# Patient Record
Sex: FEMALE | Race: WHITE | NOT HISPANIC OR LATINO | Employment: OTHER | URBAN - METROPOLITAN AREA
[De-identification: names, ages, dates, MRNs, and addresses within clinical notes are randomized per-mention and may not be internally consistent; named-entity substitution may affect disease eponyms.]

---

## 2021-08-19 DIAGNOSIS — Z12.31 SCREENING MAMMOGRAM FOR HIGH-RISK PATIENT: Primary | ICD-10-CM

## 2021-08-20 ENCOUNTER — HOSPITAL ENCOUNTER (OUTPATIENT)
Dept: RADIOLOGY | Facility: CLINIC | Age: 81
Discharge: HOME | End: 2021-08-20
Payer: MEDICARE

## 2021-08-20 VITALS — BODY MASS INDEX: 30.35 KG/M2 | WEIGHT: 212 LBS | HEIGHT: 70 IN

## 2021-08-20 DIAGNOSIS — Z12.31 SCREENING MAMMOGRAM FOR HIGH-RISK PATIENT: ICD-10-CM

## 2021-08-20 PROCEDURE — 77067 SCR MAMMO BI INCL CAD: CPT | Mod: S$GLB,,, | Performed by: RADIOLOGY

## 2021-08-20 PROCEDURE — 77067 MAMMO DIGITAL SCREENING BILAT WITH TOMO: ICD-10-PCS | Mod: S$GLB,,, | Performed by: RADIOLOGY

## 2021-08-20 PROCEDURE — 77063 MAMMO DIGITAL SCREENING BILAT WITH TOMO: ICD-10-PCS | Mod: S$GLB,,, | Performed by: RADIOLOGY

## 2021-08-20 PROCEDURE — 77063 BREAST TOMOSYNTHESIS BI: CPT | Mod: S$GLB,,, | Performed by: RADIOLOGY

## 2021-09-07 ENCOUNTER — TELEPHONE (OUTPATIENT)
Dept: OBSTETRICS AND GYNECOLOGY | Facility: CLINIC | Age: 81
End: 2021-09-07

## 2021-09-14 ENCOUNTER — TELEPHONE (OUTPATIENT)
Dept: OBSTETRICS AND GYNECOLOGY | Facility: CLINIC | Age: 81
End: 2021-09-14

## 2022-08-22 ENCOUNTER — HOSPITAL ENCOUNTER (OUTPATIENT)
Dept: RADIOLOGY | Facility: CLINIC | Age: 82
Discharge: HOME | End: 2022-08-22
Attending: OBSTETRICS & GYNECOLOGY
Payer: MEDICARE

## 2022-08-22 VITALS — HEIGHT: 70 IN | WEIGHT: 212 LBS | BODY MASS INDEX: 30.35 KG/M2

## 2022-08-22 DIAGNOSIS — Z12.31 ENCOUNTER FOR SCREENING MAMMOGRAM FOR MALIGNANT NEOPLASM OF BREAST: ICD-10-CM

## 2022-08-22 PROCEDURE — 77067 MAMMO DIGITAL SCREENING BILAT WITH TOMO: ICD-10-PCS | Mod: S$GLB,,, | Performed by: RADIOLOGY

## 2022-08-22 PROCEDURE — 77063 MAMMO DIGITAL SCREENING BILAT WITH TOMO: ICD-10-PCS | Mod: S$GLB,,, | Performed by: RADIOLOGY

## 2022-08-22 PROCEDURE — 77063 BREAST TOMOSYNTHESIS BI: CPT | Mod: S$GLB,,, | Performed by: RADIOLOGY

## 2022-08-22 PROCEDURE — 77067 SCR MAMMO BI INCL CAD: CPT | Mod: S$GLB,,, | Performed by: RADIOLOGY

## 2023-06-28 ENCOUNTER — TELEPHONE (OUTPATIENT)
Dept: OTOLARYNGOLOGY | Facility: CLINIC | Age: 83
End: 2023-06-28
Payer: MEDICARE

## 2023-06-29 ENCOUNTER — OFFICE VISIT (OUTPATIENT)
Dept: OTOLARYNGOLOGY | Facility: CLINIC | Age: 83
End: 2023-06-29
Payer: MEDICARE

## 2023-06-29 VITALS
SYSTOLIC BLOOD PRESSURE: 157 MMHG | DIASTOLIC BLOOD PRESSURE: 81 MMHG | WEIGHT: 199.63 LBS | BODY MASS INDEX: 28.64 KG/M2

## 2023-06-29 DIAGNOSIS — H91.90 HEARING LOSS, UNSPECIFIED HEARING LOSS TYPE, UNSPECIFIED LATERALITY: ICD-10-CM

## 2023-06-29 DIAGNOSIS — J30.0 VASOMOTOR RHINITIS: ICD-10-CM

## 2023-06-29 DIAGNOSIS — H60.8X3: Primary | ICD-10-CM

## 2023-06-29 PROCEDURE — 99203 OFFICE O/P NEW LOW 30 MIN: CPT | Mod: S$PBB,,, | Performed by: OTOLARYNGOLOGY

## 2023-06-29 PROCEDURE — 99999 PR PBB SHADOW E&M-EST. PATIENT-LVL II: ICD-10-PCS | Mod: PBBFAC,,, | Performed by: OTOLARYNGOLOGY

## 2023-06-29 PROCEDURE — 99999 PR PBB SHADOW E&M-EST. PATIENT-LVL II: CPT | Mod: PBBFAC,,, | Performed by: OTOLARYNGOLOGY

## 2023-06-29 PROCEDURE — 99203 PR OFFICE/OUTPT VISIT, NEW, LEVL III, 30-44 MIN: ICD-10-PCS | Mod: S$PBB,,, | Performed by: OTOLARYNGOLOGY

## 2023-06-29 PROCEDURE — 99212 OFFICE O/P EST SF 10 MIN: CPT | Mod: PBBFAC,PN | Performed by: OTOLARYNGOLOGY

## 2023-06-29 RX ORDER — CIPROFLOXACIN HYDROCHLORIDE 3 MG/ML
SOLUTION/ DROPS OPHTHALMIC
Qty: 10 ML | Refills: 3 | Status: SHIPPED | OUTPATIENT
Start: 2023-06-29

## 2023-06-29 RX ORDER — EVOLOCUMAB 140 MG/ML
140 INJECTION, SOLUTION SUBCUTANEOUS
COMMUNITY
Start: 2022-09-15

## 2023-06-29 RX ORDER — ERGOCALCIFEROL 1.25 MG/1
50000 CAPSULE ORAL
COMMUNITY
Start: 2023-01-07

## 2023-06-29 RX ORDER — LEVOTHYROXINE SODIUM 88 UG/1
88 TABLET ORAL
COMMUNITY
Start: 2023-06-26

## 2023-06-29 RX ORDER — SPIRONOLACTONE 25 MG/1
25 TABLET ORAL
COMMUNITY
Start: 2023-04-18

## 2023-06-29 RX ORDER — IPRATROPIUM BROMIDE 42 UG/1
2 SPRAY, METERED NASAL 4 TIMES DAILY
Qty: 45 ML | Refills: 10 | Status: SHIPPED | OUTPATIENT
Start: 2023-06-29 | End: 2024-06-28

## 2023-06-29 RX ORDER — PANTOPRAZOLE SODIUM 40 MG/1
40 TABLET, DELAYED RELEASE ORAL
COMMUNITY
Start: 2023-06-05

## 2023-06-29 NOTE — PROGRESS NOTES
Subjective:       Patient ID: Dacia Tovar is a 83 y.o. female.    Chief Complaint: Sinus Problem (Pt c/o sinus headaches, cough, and ear draining.  )      This patient presents telling me that she has a long history of hearing loss she is had hearing aids for at least 10 years she recently got a 2nd pair for 5000 dollars that she says her a piece of junk I do not know the exact brands but she did mentioned them to me in any case she has a pair of hearing aids in and we communicate with only slight difficulty as long as I keep my voice character loud.      She tells me she has had some odorous drainage from both ears she says it smells like Pseudomonas and she has a little bit of discomfort on the left ear in particular that bothers her.  She also mentions sinus drainage which include some postnasal drip but also a significant amount of anterior rhinorrhea that is especially notable in the morning.    Sinus Problem        Objective:      ENT Physical Exam  Constitutional  Appearance: patient appears well-developed, well-nourished and well-groomed,  Communication/Voice: communication appropriate for developmental age; vocal quality normal;  Head and Face  Appearance: head appears normal, face appears normal and face appears atraumatic;  Salivary: glands normal;  Ear  Hearing: intact;  Auricles: right auricle normal; left auricle normal;  Tympanic Membranes: right tympanic membrane normal; left tympanic membrane normal;  Ear comments: After removing her hearing aids on the left side in the ear canal stuck to the superior canal wall was an extra silicone ear piece from one of her hearing aids.  It was easy to remove and there was some odor this thin material between the foreign body in the ear canal.  On both sides there is flaky crusty yellow flakes that do not obstruct and are not large in volume but are notable.  There was no purulence on the right side or foreign body.  Nose  External Nose: nares patent  bilaterally; external nose normal;  Internal Nose: nasal mucosa normal; septum normal; bilateral inferior turbinates normal;  Oral Cavity/Oropharynx  Lips: normal;  Teeth: normal;  Gums: gingiva normal;  Tongue: normal;  Oral mucosa: normal;  Hard palate: normal;  Soft palate: normal;  Tonsils: normal;  Base of Tongue: normal;  Posterior pharyngeal wall: normal;  Neck  Neck: neck normal; neck palpation normal;  Thyroid: thyroid normal;  Lymphatic  Palpation: lymph nodes normal;        Assessment:       1. Contact otitis externa of both ears, unspecified chronicity    2. Hearing loss, unspecified hearing loss type, unspecified laterality    3. Vasomotor rhinitis         Plan:          So I think she needs some eardrops for especially the odorous material on the left that is secondary to the foreign body that we removed I sent in Cipro drops for her to use for at least five or seven days even if things improve I have asked her to let me know if that has not been helpful she can contact me through the portal or on the phone and we can discuss different drops.  She has used neomycin drops before she mentions.      Additionally for the drainage I have asked her to try ipratropium spray she does not think she is tried that before but may have and see if that helps with her drainage.

## 2023-10-03 ENCOUNTER — TELEPHONE (OUTPATIENT)
Dept: OBSTETRICS AND GYNECOLOGY | Facility: CLINIC | Age: 83
End: 2023-10-03
Payer: MEDICARE

## 2023-10-03 NOTE — TELEPHONE ENCOUNTER
Pt has not been seen since 2020. Adv pt that she would need to be seen in office before we can give her an order. Pt understood. Pt has been scheduled for 10/24 with dr. jean  ----- Message from Adrianna Mishra sent at 10/3/2023  3:15 PM CDT -----  Contact: PT  Type: Needs Medical Advice    Who Called: PT  Best Call Back Number: [phone]  Additional  Information: PT requesting a call back regarding getting an Mammogram, order. Pt upset that she is no longer able to have in done there at the office. Said she's old and running around to diff places is hard on her.  Please Advise- Thank you

## 2023-10-24 ENCOUNTER — OFFICE VISIT (OUTPATIENT)
Dept: OBSTETRICS AND GYNECOLOGY | Facility: CLINIC | Age: 83
End: 2023-10-24
Payer: MEDICARE

## 2023-10-24 VITALS
DIASTOLIC BLOOD PRESSURE: 73 MMHG | SYSTOLIC BLOOD PRESSURE: 147 MMHG | WEIGHT: 182.81 LBS | HEART RATE: 70 BPM | HEIGHT: 70 IN | BODY MASS INDEX: 26.17 KG/M2

## 2023-10-24 DIAGNOSIS — N39.3 PRIMARY STRESS URINARY INCONTINENCE: Primary | ICD-10-CM

## 2023-10-24 DIAGNOSIS — N32.81 OVERACTIVE BLADDER: ICD-10-CM

## 2023-10-24 PROBLEM — M19.90 ARTHRITIS: Status: ACTIVE | Noted: 2023-10-24

## 2023-10-24 PROBLEM — R42 DIZZINESS: Status: ACTIVE | Noted: 2023-10-24

## 2023-10-24 PROBLEM — M10.9 GOUT: Status: ACTIVE | Noted: 2023-10-24

## 2023-10-24 PROBLEM — E55.9 VITAMIN D DEFICIENCY: Status: ACTIVE | Noted: 2023-10-24

## 2023-10-24 PROBLEM — F41.9 ANXIETY DISORDER: Status: ACTIVE | Noted: 2023-10-24

## 2023-10-24 PROBLEM — Z86.19 HISTORY OF HEPATITIS: Status: ACTIVE | Noted: 2023-10-24

## 2023-10-24 PROBLEM — D72.819 LEUKOPENIA: Status: ACTIVE | Noted: 2023-10-24

## 2023-10-24 PROBLEM — E78.00 PURE HYPERCHOLESTEROLEMIA: Status: ACTIVE | Noted: 2023-10-24

## 2023-10-24 PROBLEM — E79.0 BLOOD URATE RAISED: Status: ACTIVE | Noted: 2023-10-24

## 2023-10-24 PROBLEM — K21.9 GASTROESOPHAGEAL REFLUX DISEASE: Status: ACTIVE | Noted: 2023-10-24

## 2023-10-24 PROBLEM — I77.9 DISORDER OF CAROTID ARTERY: Status: ACTIVE | Noted: 2023-10-24

## 2023-10-24 PROCEDURE — 87086 URINE CULTURE/COLONY COUNT: CPT | Performed by: OBSTETRICS & GYNECOLOGY

## 2023-10-24 PROCEDURE — 99213 PR OFFICE/OUTPT VISIT, EST, LEVL III, 20-29 MIN: ICD-10-PCS | Mod: S$GLB,,, | Performed by: OBSTETRICS & GYNECOLOGY

## 2023-10-24 PROCEDURE — 99213 OFFICE O/P EST LOW 20 MIN: CPT | Mod: S$GLB,,, | Performed by: OBSTETRICS & GYNECOLOGY

## 2023-10-24 RX ORDER — ASPIRIN 81 MG/1
81 TABLET ORAL
COMMUNITY

## 2023-10-24 RX ORDER — ERGOCALCIFEROL 1.25 MG/1
50000 CAPSULE ORAL
COMMUNITY
Start: 2022-07-07

## 2023-10-24 RX ORDER — POTASSIUM CHLORIDE 750 MG/1
10 TABLET, EXTENDED RELEASE ORAL
COMMUNITY
Start: 2022-07-07

## 2023-10-24 RX ORDER — TOLTERODINE 4 MG/1
4 CAPSULE, EXTENDED RELEASE ORAL DAILY
Qty: 30 CAPSULE | Refills: 11 | Status: SHIPPED | OUTPATIENT
Start: 2023-10-24 | End: 2024-10-23

## 2023-10-24 RX ORDER — TRIAMCINOLONE ACETONIDE 55 UG/1
2 SPRAY, METERED NASAL
COMMUNITY

## 2023-10-24 RX ORDER — METOPROLOL SUCCINATE 25 MG/1
25 TABLET, EXTENDED RELEASE ORAL
COMMUNITY
Start: 2023-08-02

## 2023-10-24 RX ORDER — AMLODIPINE BESYLATE 5 MG/1
5 TABLET ORAL
COMMUNITY
Start: 2023-10-18

## 2023-10-24 NOTE — PROGRESS NOTES
Dcaia Tovar is a 83 y.o.  who presents today for GYN problem visit.     C/C:   Chief Complaint   Patient presents with    Urinary Incontinence       HPI: Has GYN related concerns including bladder issue.  Patient states that she has problems with nocturia since being placed on her new diuretic blood pressure medication and she now is having complete emptying of her bladder when she gets up at night time to go to the bathroom which has caused her to fall and she has had numerous falls and sometimes with head injuries.  She had a sling bladder procedure done by Dr. Blas in  and had fairly good results with that but in the last year to she has started leaking urine mainly at nighttime when she stands up to go to the bathroom.  We discussed options and she would like to try medications initially so we will start her on Detrol at this time but she probably needs to see her urologist, Dr. Blas and see if he has any better options for her.  She states she will call me if the Detrol is not working good and I will give her a referral to Dr. Blas.    MENSTRUAL HISTORY  No LMP recorded. Patient has had a hysterectomy..      PAP HISTORY: last pap hysterectomy,  denies any history of abnormal pap smear        Review of patient's allergies indicates:   Allergen Reactions    Rosuvastatin      Other reaction(s): muscle cramp  elevated cpk    Meperidine Nausea And Vomiting     Violently ill per patient  cs       History reviewed. No pertinent past medical history.  Past Surgical History:   Procedure Laterality Date    BLADDER SUSPENSION  2002    HYSTERECTOMY      OOPHORECTOMY       Past Surgical History:   Procedure Laterality Date    BLADDER SUSPENSION  2002    HYSTERECTOMY      OOPHORECTOMY       OB History    Para Term  AB Living   4 4 4         SAB IAB Ectopic Multiple Live Births                  # Outcome Date GA Lbr Paolo/2nd Weight Sex Delivery Anes PTL Lv   4 Term        "     3 Term            2 Term            1 Term              OB History          4    Para   4    Term   4            AB        Living             SAB        IAB        Ectopic        Multiple        Live Births                   Family History   Problem Relation Age of Onset    Breast cancer Sister      Social History     Substance and Sexual Activity   Sexual Activity Not on file       No, Primary Doctor          ROS:  GENERAL: Denies weight gain or weight loss. Feeling well overall.   SKIN: Denies rash or lesions.   HEAD: Denies head injury or headache.   NODES: Denies enlarged lymph nodes.   CHEST: Denies chest pain or shortness of breath.    ABDOMEN: No abdominal pain, constipation, diarrhea, nausea, vomiting or rectal bleeding.   URINARY:  See HPI  REPRODUCTIVE: See HPI.   BREASTS: denies pain, lumps, or nipple discharge.   HEMATOLOGIC: No easy bruisability or excessive bleeding.   MUSCULOSKELETAL: Denies joint pain or swelling.   NEUROLOGIC: Denies syncope or weakness.   PSYCHIATRIC: Denies depression, anxiety or mood swings.      OBJECTIVE:  BP (!) 147/73   Pulse 70   Ht 5' 10" (1.778 m)   Wt 82.9 kg (182 lb 12.8 oz)   BMI 26.23 kg/m²   PHYSICAL EXAM:  APPEARANCE: Well nourished, well developed, in no acute distress.  AFFECT: WNL, alert and oriented x 3  SKIN: No acne or hirsutism  CHEST: Good respiratory effect  ABDOMEN: Soft.  No tenderness or masses.  No hepatosplenomegaly.  No hernias.  BREASTS:  Deferred  PELVIC:  External genitalia normal.  Small vaginal introitus opening and has vaginal atrophy signs.  The anterior vaginal wall is well supported and no significant cystocele or rectocele palpated.  Uterus tubes and ovaries are absent.    EXTREMITIES: No edema.      A:  Urinary incontinence, nocturia, overactive bladder,  83 y.o. female  for GYN problem visit  Body mass index is 26.23 kg/m².  Patient Active Problem List   Diagnosis    Anxiety disorder    Asthma    Arthritis    " Blood urate raised    Gout    Coronary artery disease    Disorder of carotid artery    Dizziness    Gastroesophageal reflux disease    History of hepatitis    Hypertension    Hypothyroidism    Leukopenia    Pure hypercholesterolemia    Vitamin D deficiency    Overactive bladder    Primary stress urinary incontinence         P:  Will try Detrol initially but probably needs a consult by her prior urologist Dr. Blas.  Overactive bladder  -     tolterodine (DETROL LA) 4 MG 24 hr capsule; Take 1 capsule (4 mg total) by mouth once daily.  Dispense: 30 capsule; Refill: 11    Primary stress urinary incontinence  -     tolterodine (DETROL LA) 4 MG 24 hr capsule; Take 1 capsule (4 mg total) by mouth once daily.  Dispense: 30 capsule; Refill: 11      ----Continue annual exams, return then or sooner prn      Follow up if symptoms worsen or fail to improve.

## 2023-10-26 LAB — BACTERIA UR CULT: NO GROWTH

## 2024-08-06 ENCOUNTER — OFFICE VISIT (OUTPATIENT)
Dept: PODIATRY | Facility: CLINIC | Age: 84
End: 2024-08-06
Payer: MEDICARE

## 2024-08-06 VITALS
HEART RATE: 70 BPM | HEIGHT: 70 IN | WEIGHT: 178 LBS | DIASTOLIC BLOOD PRESSURE: 83 MMHG | BODY MASS INDEX: 25.48 KG/M2 | SYSTOLIC BLOOD PRESSURE: 143 MMHG

## 2024-08-06 DIAGNOSIS — L97.511 ULCER OF BOTH FEET, LIMITED TO BREAKDOWN OF SKIN: Primary | ICD-10-CM

## 2024-08-06 DIAGNOSIS — L97.521 ULCER OF BOTH FEET, LIMITED TO BREAKDOWN OF SKIN: Primary | ICD-10-CM

## 2024-08-06 DIAGNOSIS — I73.9 PERIPHERAL VASCULAR DISEASE: ICD-10-CM

## 2024-08-06 PROCEDURE — 99214 OFFICE O/P EST MOD 30 MIN: CPT | Mod: PBBFAC,PN | Performed by: PODIATRIST

## 2024-08-06 PROCEDURE — 99202 OFFICE O/P NEW SF 15 MIN: CPT | Mod: S$PBB,,, | Performed by: PODIATRIST

## 2024-08-06 PROCEDURE — 99999 PR PBB SHADOW E&M-EST. PATIENT-LVL IV: CPT | Mod: PBBFAC,,, | Performed by: PODIATRIST

## 2024-08-06 RX ORDER — APIXABAN 2.5 MG/1
TABLET, FILM COATED ORAL
COMMUNITY
Start: 2024-01-02

## 2024-08-06 RX ORDER — VERAPAMIL HYDROCHLORIDE 120 MG/1
TABLET, FILM COATED, EXTENDED RELEASE ORAL
COMMUNITY
Start: 2024-03-18

## 2024-08-06 RX ORDER — VALSARTAN 40 MG/1
TABLET ORAL
COMMUNITY
Start: 2023-12-04

## 2024-08-06 RX ORDER — FUROSEMIDE 20 MG/1
TABLET ORAL
COMMUNITY
Start: 2024-01-11

## 2024-08-08 PROBLEM — L97.511 ULCER OF BOTH FEET, LIMITED TO BREAKDOWN OF SKIN: Status: ACTIVE | Noted: 2024-08-08

## 2024-08-08 PROBLEM — I73.9 PERIPHERAL VASCULAR DISEASE: Status: ACTIVE | Noted: 2024-08-08

## 2024-08-08 PROBLEM — L97.521 ULCER OF BOTH FEET, LIMITED TO BREAKDOWN OF SKIN: Status: ACTIVE | Noted: 2024-08-08

## 2024-12-23 ENCOUNTER — TELEPHONE (OUTPATIENT)
Dept: OPHTHALMOLOGY | Facility: CLINIC | Age: 84
End: 2024-12-23
Payer: MEDICARE

## 2024-12-23 NOTE — TELEPHONE ENCOUNTER
Spoke with pt about referral in [facility]. She is added to the August 2025 waiting list.

## 2025-02-25 ENCOUNTER — TELEPHONE (OUTPATIENT)
Dept: OPHTHALMOLOGY | Facility: CLINIC | Age: 85
End: 2025-02-25
Payer: MEDICARE

## 2025-02-25 NOTE — TELEPHONE ENCOUNTER
----- Message from Meet Trejo sent at 2/24/2025  5:06 PM CST -----    ----- Message -----  From: Vinita Ferrer  Sent: 2/24/2025   3:41 PM CST  To: Brendon THOMAS Staff    Type:  Patient Referral CallWho Called:Rosa Calvillo the patient know what this is regarding?:ApptWould the patient rather a call back or a response via [facility]? [facility] Call Back Number:[phone] Information:

## 2025-08-27 ENCOUNTER — LAB VISIT (OUTPATIENT)
Dept: LAB | Facility: HOSPITAL | Age: 85
End: 2025-08-27
Attending: STUDENT IN AN ORGANIZED HEALTH CARE EDUCATION/TRAINING PROGRAM
Payer: MEDICARE

## 2025-08-27 ENCOUNTER — CLINICAL SUPPORT (OUTPATIENT)
Dept: OPHTHALMOLOGY | Facility: CLINIC | Age: 85
End: 2025-08-27
Payer: MEDICARE

## 2025-08-27 ENCOUNTER — OFFICE VISIT (OUTPATIENT)
Dept: OPHTHALMOLOGY | Facility: CLINIC | Age: 85
End: 2025-08-27
Payer: MEDICARE

## 2025-08-27 DIAGNOSIS — H54.7 UNSPECIFIED VISUAL LOSS: ICD-10-CM

## 2025-08-27 DIAGNOSIS — H53.15 VISUAL DISTORTIONS OF SHAPE AND SIZE: Primary | ICD-10-CM

## 2025-08-27 DIAGNOSIS — H53.15 VISUAL DISTORTIONS OF SHAPE AND SIZE: ICD-10-CM

## 2025-08-27 LAB
ABSOLUTE EOSINOPHIL (OHS): 0 K/UL
ABSOLUTE MONOCYTE (OHS): 0.24 K/UL (ref 0.3–1)
ABSOLUTE NEUTROPHIL COUNT (OHS): 2.84 K/UL (ref 1.8–7.7)
ALBUMIN SERPL BCP-MCNC: 4.6 G/DL (ref 3.5–5.2)
ALP SERPL-CCNC: 124 UNIT/L (ref 40–150)
ALT SERPL W/O P-5'-P-CCNC: 23 UNIT/L (ref 0–55)
ANION GAP (OHS): 10 MMOL/L (ref 8–16)
AST SERPL-CCNC: 31 UNIT/L (ref 0–50)
BASOPHILS # BLD AUTO: 0.03 K/UL
BASOPHILS NFR BLD AUTO: 0.8 %
BILIRUB SERPL-MCNC: 0.6 MG/DL (ref 0.1–1)
BUN SERPL-MCNC: 33 MG/DL (ref 8–23)
CALCIUM SERPL-MCNC: 10.5 MG/DL (ref 8.7–10.5)
CHLORIDE SERPL-SCNC: 105 MMOL/L (ref 95–110)
CO2 SERPL-SCNC: 26 MMOL/L (ref 23–29)
CREAT SERPL-MCNC: 1.2 MG/DL (ref 0.5–1.4)
ERYTHROCYTE [DISTWIDTH] IN BLOOD BY AUTOMATED COUNT: 11.7 % (ref 11.5–14.5)
GFR SERPLBLD CREATININE-BSD FMLA CKD-EPI: 45 ML/MIN/1.73/M2
GLUCOSE SERPL-MCNC: 101 MG/DL (ref 70–110)
HCT VFR BLD AUTO: 34.8 % (ref 37–48.5)
HGB BLD-MCNC: 11.4 GM/DL (ref 12–16)
IMM GRANULOCYTES # BLD AUTO: 0 K/UL (ref 0–0.04)
IMM GRANULOCYTES NFR BLD AUTO: 0 % (ref 0–0.5)
LYMPHOCYTES # BLD AUTO: 0.77 K/UL (ref 1–4.8)
MCH RBC QN AUTO: 31.7 PG (ref 27–31)
MCHC RBC AUTO-ENTMCNC: 32.8 G/DL (ref 32–36)
MCV RBC AUTO: 97 FL (ref 82–98)
NUCLEATED RBC (/100WBC) (OHS): 0 /100 WBC
PLATELET # BLD AUTO: 191 K/UL (ref 150–450)
PMV BLD AUTO: 10.8 FL (ref 9.2–12.9)
POTASSIUM SERPL-SCNC: 4.2 MMOL/L (ref 3.5–5.1)
PROT SERPL-MCNC: 7.3 GM/DL (ref 6–8.4)
RBC # BLD AUTO: 3.6 M/UL (ref 4–5.4)
RELATIVE EOSINOPHIL (OHS): 0 %
RELATIVE LYMPHOCYTE (OHS): 19.8 % (ref 18–48)
RELATIVE MONOCYTE (OHS): 6.2 % (ref 4–15)
RELATIVE NEUTROPHIL (OHS): 73.2 % (ref 38–73)
SODIUM SERPL-SCNC: 141 MMOL/L (ref 136–145)
WBC # BLD AUTO: 3.88 K/UL (ref 3.9–12.7)

## 2025-08-27 PROCEDURE — 92133 CPTRZD OPH DX IMG PST SGM ON: CPT | Mod: PBBFAC | Performed by: STUDENT IN AN ORGANIZED HEALTH CARE EDUCATION/TRAINING PROGRAM

## 2025-08-27 PROCEDURE — 82164 ANGIOTENSIN I ENZYME TEST: CPT

## 2025-08-27 PROCEDURE — 92083 EXTENDED VISUAL FIELD XM: CPT | Mod: PBBFAC | Performed by: STUDENT IN AN ORGANIZED HEALTH CARE EDUCATION/TRAINING PROGRAM

## 2025-08-27 PROCEDURE — 99212 OFFICE O/P EST SF 10 MIN: CPT | Mod: PBBFAC | Performed by: STUDENT IN AN ORGANIZED HEALTH CARE EDUCATION/TRAINING PROGRAM

## 2025-08-27 PROCEDURE — 86235 NUCLEAR ANTIGEN ANTIBODY: CPT | Mod: 59

## 2025-08-27 PROCEDURE — 84520 ASSAY OF UREA NITROGEN: CPT

## 2025-08-27 PROCEDURE — 86480 TB TEST CELL IMMUN MEASURE: CPT

## 2025-08-27 PROCEDURE — 82787 IGG 1 2 3 OR 4 EACH: CPT

## 2025-08-27 PROCEDURE — 86036 ANCA SCREEN EACH ANTIBODY: CPT

## 2025-08-27 PROCEDURE — 85025 COMPLETE CBC W/AUTO DIFF WBC: CPT

## 2025-08-27 PROCEDURE — 86039 ANTINUCLEAR ANTIBODIES (ANA): CPT

## 2025-08-27 PROCEDURE — 36415 COLL VENOUS BLD VENIPUNCTURE: CPT

## 2025-08-27 PROCEDURE — 86235 NUCLEAR ANTIGEN ANTIBODY: CPT

## 2025-08-27 PROCEDURE — 99999 PR PBB SHADOW E&M-EST. PATIENT-LVL II: CPT | Mod: PBBFAC,,, | Performed by: STUDENT IN AN ORGANIZED HEALTH CARE EDUCATION/TRAINING PROGRAM

## 2025-08-27 PROCEDURE — 86225 DNA ANTIBODY NATIVE: CPT

## 2025-08-28 ENCOUNTER — TELEPHONE (OUTPATIENT)
Dept: OPHTHALMOLOGY | Facility: CLINIC | Age: 85
End: 2025-08-28
Payer: MEDICARE

## 2025-08-28 LAB
ANA (OHS): POSITIVE
ANA PATTERN 1 (OHS): ABNORMAL
ANA TITER 1 (OHS): ABNORMAL
MITOGEN MINUS NIL (OHS): 9.9
NIL TB SYNCED (OHS): 0.1
QUANTIFERON GOLD INTERP (OHS): NEGATIVE
SSA  ANTIBODY (OHS): 0.05 RATIO (ref 0–0.99)
SSA INTERPRETATION (OHS): NEGATIVE
SSB  ANTIBODY (OHS): 0.07 RATIO
SSB INTERPRETATION (OHS): NEGATIVE
TB1 AG MINUS NIL (OHS): <0
TB2 AG MINUS NIL (OHS): <0

## 2025-08-29 LAB
ACE SERPL-CCNC: 20 U/L (ref 16–85)
B HENSELAE IGG TITR SER IF: NORMAL TITER
B HENSELAE IGM TITR SER IF: NORMAL TITER
B QUINTANA IGG TITR SER IF: NORMAL TITER
B QUINTANA IGM TITR SER IF: NORMAL TITER
DSDNA ANTIBODY (OHS): NORMAL
DSDNA ANTIBODY TITER (OHS): NORMAL

## 2025-09-01 LAB — W IMMUNOGLOBULIN G SUBCLASS 4: 16 MG/DL

## 2025-09-02 LAB
SM  ANTIBODY (OHS): 0.08 RATIO
SM INTERPRETATION (OHS): NEGATIVE
SM/RNP ANTIBODY (OHS): 0.11 RATIO
SM/RNP INTERPRETATION (OHS): NEGATIVE
W C-ANCA: NORMAL TITER
W P-ANCA: NORMAL TITER

## 2025-09-04 LAB
AQP4 H2O CHANNEL IGG SERPL QL: NEGATIVE
IMMUNOLOGIST REVIEW: NORMAL
MOG IGG1 SERPL QL FC: NEGATIVE

## 2025-09-05 ENCOUNTER — TELEPHONE (OUTPATIENT)
Dept: OPHTHALMOLOGY | Facility: CLINIC | Age: 85
End: 2025-09-05
Payer: MEDICARE